# Patient Record
Sex: MALE | Race: WHITE | URBAN - METROPOLITAN AREA
[De-identification: names, ages, dates, MRNs, and addresses within clinical notes are randomized per-mention and may not be internally consistent; named-entity substitution may affect disease eponyms.]

---

## 2019-10-15 ENCOUNTER — NEW PATIENT (OUTPATIENT)
Dept: URBAN - METROPOLITAN AREA CLINIC 64 | Facility: CLINIC | Age: 74
End: 2019-10-15
Payer: COMMERCIAL

## 2019-10-15 DIAGNOSIS — E11.9 TYPE 2 DIABETES MELLITUS WITHOUT COMPLICATIONS: Primary | ICD-10-CM

## 2019-10-15 DIAGNOSIS — Z79.84 LONG TERM (CURRENT) USE OF ORAL ANTIDIABETIC DRUGS: ICD-10-CM

## 2019-10-15 PROCEDURE — 92004 COMPRE OPH EXAM NEW PT 1/>: CPT | Performed by: OPTOMETRIST

## 2019-10-15 ASSESSMENT — INTRAOCULAR PRESSURE
OS: 13
OD: 14

## 2019-10-15 ASSESSMENT — KERATOMETRY
OD: 43.55
OS: 43.55

## 2019-10-15 ASSESSMENT — VISUAL ACUITY
OS: 20/25
OD: 20/30

## 2021-04-23 ENCOUNTER — OFFICE VISIT (OUTPATIENT)
Dept: URBAN - METROPOLITAN AREA CLINIC 64 | Facility: CLINIC | Age: 76
End: 2021-04-23
Payer: COMMERCIAL

## 2021-04-23 DIAGNOSIS — H52.223 REGULAR ASTIGMATISM, BILATERAL: ICD-10-CM

## 2021-04-23 DIAGNOSIS — H25.13 BILATERAL NUCLEAR SCLEROSIS CATARACT: Primary | ICD-10-CM

## 2021-04-23 PROCEDURE — 92014 COMPRE OPH EXAM EST PT 1/>: CPT | Performed by: OPTOMETRIST

## 2021-04-23 ASSESSMENT — INTRAOCULAR PRESSURE
OS: 17
OD: 18

## 2021-04-23 ASSESSMENT — VISUAL ACUITY
OS: 20/20
OD: 20/20

## 2021-04-23 ASSESSMENT — KERATOMETRY
OD: 43.49
OS: 43.69

## 2021-04-23 NOTE — IMPRESSION/PLAN
Impression: Bilateral nuclear sclerosis cataract: H25.13. Plan: Discussed sx. vs. observation. We decided to observe. Trialed manifest and improvement noticed. Glasses Rx given.

## 2023-09-22 ENCOUNTER — OFFICE VISIT (OUTPATIENT)
Dept: URBAN - METROPOLITAN AREA CLINIC 64 | Facility: LOCATION | Age: 78
End: 2023-09-22
Payer: MEDICARE

## 2023-09-22 DIAGNOSIS — E11.9 TYPE 2 DIABETES MELLITUS WITHOUT COMPLICATIONS: Primary | ICD-10-CM

## 2023-09-22 DIAGNOSIS — H52.4 PRESBYOPIA: ICD-10-CM

## 2023-09-22 DIAGNOSIS — H25.13 AGE-RELATED NUCLEAR CATARACT, BILATERAL: ICD-10-CM

## 2023-09-22 PROCEDURE — 99214 OFFICE O/P EST MOD 30 MIN: CPT

## 2023-09-22 ASSESSMENT — INTRAOCULAR PRESSURE
OS: 17
OD: 18

## 2023-09-22 ASSESSMENT — VISUAL ACUITY
OD: 20/25
OS: 20/25

## 2023-09-22 ASSESSMENT — KERATOMETRY
OD: 43.55
OS: 43.67